# Patient Record
(demographics unavailable — no encounter records)

---

## 2025-02-28 NOTE — PLAN
[FreeTextEntry1] :     78-year-old female past medical history of hypothyroidism, GERD, hypertension hyperlipidemia, recently traveled to [place] at and subsequently took a [place] presents for cough weakness fatigue.  Patient says she usually around this time of year takes a trip to [place] for 2 months and then goes on a cruise.  She said during the cruise she did not wear a mask and there were lots of people everywhere.  She says since her returned from the cruise she has been coughing for 2 weeks runny nose and past 2 days she has been feeling some constant discomfort in her back and front of chest exacerbated with coughing.  She reports feeling fatigued rundown.   Upper respiratory infection symptoms  >> Atypical chest pain, possible pneumonia? >> Will get chest x-ray >> Will start antibiotics since patient has been having symptoms for 2 weeks also is elderly, immunocompromised with multiple comorbidities >> Start Ceftin 500 mg twice daily x 5 days >> Full viral panel swab taken >> Basic blood work drawn today given patient is elderly and immunocompromised   Hypertension >> BP elevated in the office could be because of upper respiratory infection?,  Also patient said she did not take her blood pressure medication this morning >> Advised to check BP at home and also take BP as prescribed

## 2025-02-28 NOTE — HISTORY OF PRESENT ILLNESS
[FreeTextEntry1] : flu like symptoms [de-identified] : 78-year-old female past medical history of hypothyroidism, GERD, hypertension hyperlipidemia, recently traveled to [place] at and subsequently took a [place] presents for cough weakness fatigue.  Patient says she usually around this time of year takes a trip to [place] for 2 months and then goes on a cruise.  She said during the cruise she did not wear a mask and there were lots of people everywhere.  She says since her returned from the cruise she has been coughing for 2 weeks runny nose and past 2 days she has been feeling some constant discomfort in her back and front of chest exacerbated with coughing.  She reports feeling fatigued rundown. All other ROS is negative.

## 2025-03-11 NOTE — HISTORY OF PRESENT ILLNESS
[FreeTextEntry8] : cc-- UTI sx  79 yo F with hx kidney stone presents for hematuria. Pt reported last night, urine looked like cranberry juice. Afterwards, drank a lot of water and it cleared up. There is slight burning as well, along with frequency. Pt denies back pain like kidney stone. Most recent CT stone hunt was 2023 and was negative.

## 2025-03-18 NOTE — DISCUSSION/SUMMARY
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 #1 CV- normal ECG, dances for exercise #2 HTN- c/w irbesartan, nebivolol 2.5mg for now #3 HLD- c/w ezetimibe #4 Anxiety- c/w Desvenlafaxine 50mg #5 GERD- c/w pantoprazole 40mg #6 Thyroid- c/w levothyroxine 50mcg #7 Ophtho- glaucoma, on timolol, [place] ophtho in [place] [EKG obtained to assist in diagnosis and management of assessed problem(s)] : EKG obtained to assist in diagnosis and management of assessed problem(s)

## 2025-03-18 NOTE — HISTORY OF PRESENT ILLNESS
[FreeTextEntry1] : Evelia is a 78-year-old female HTN, HLD whose cardiologist is in [place]. DTr is patient here. Also, depression and anxiety. Change from metoprolol to nebivolol and feels better but gained weight. Anxiety.  The other day was walking and felt like she was low to the ground. She stopped it two weeks ago for a cold. BP was 42184. Dances for exercise.

## 2025-04-01 NOTE — HISTORY OF PRESENT ILLNESS
[FreeTextEntry1] : HERMAN BRYANT is a 78-year-old presenting for evaluation of hematuria. She reports an episode of gross hematuria 3 weeks ago that spontaneously resolved. Denies vaginal bleeding. Bleeding was accompanied by bilateral flank pain and right groin pain. No dysuria or pelvic/bladder pressure associated. Of note, she was previously evaluated by Dr. Ibarra last year for the same and was recommended to follow-up after one year. She has a renal US at the time that was negative. No h/o smoking. Report maternal history of pelvic cancer including bladder CA.

## 2025-04-01 NOTE — PHYSICAL EXAM
[Chaperone Present] : A chaperone was present in the examining room during all aspects of the physical examination [FreeTextEntry2] : Ashli [FreeTextEntry1] : General: Well, appearing, no acute distress HEENT: Normocephalic, atraumatic Respiratory: Speaking in full sentences comfortably, normal work of breathing and no cough during visit Extremities: No upper extremity edema noted Skin: No obvious rash or skin lesions Neuro: Alert and oriented x 3, speech is fluent, normal rate Psych: Normal mood and affect [Normal Appearance] : general appearance was normal [Atrophy] : atrophy [Normal] : normal [Exam Deferred] : was deferred

## 2025-04-01 NOTE — ASSESSMENT
[FreeTextEntry1] : .  CT ordered as previously has renal US last year - suspect nephrolithiasis Follow-up with Dr. Ibarra for gross hematuria